# Patient Record
Sex: FEMALE | Race: WHITE | NOT HISPANIC OR LATINO | Employment: STUDENT | URBAN - METROPOLITAN AREA
[De-identification: names, ages, dates, MRNs, and addresses within clinical notes are randomized per-mention and may not be internally consistent; named-entity substitution may affect disease eponyms.]

---

## 2017-10-20 PROBLEM — M25.571 RIGHT ANKLE PAIN: Status: ACTIVE | Noted: 2017-10-20

## 2017-10-20 PROBLEM — M25.671 ANKLE STIFFNESS, RIGHT: Status: ACTIVE | Noted: 2017-10-20

## 2017-10-20 PROBLEM — M25.674: Status: ACTIVE | Noted: 2017-10-20

## 2017-10-20 PROBLEM — M79.671 RIGHT FOOT PAIN: Status: ACTIVE | Noted: 2017-10-20

## 2020-03-11 ENCOUNTER — TELEPHONE (OUTPATIENT)
Dept: CARDIOLOGY | Facility: MEDICAL CENTER | Age: 25
End: 2020-03-11

## 2020-03-11 NOTE — TELEPHONE ENCOUNTER
Called patient in regards to records for NP appointment with  including most recent lab results, ekg, any cardiac testing or have been treated by a cardiologist. No answer, left voicemail to call back.

## 2020-03-12 ENCOUNTER — OFFICE VISIT (OUTPATIENT)
Dept: CARDIOLOGY | Facility: MEDICAL CENTER | Age: 25
End: 2020-03-12
Payer: COMMERCIAL

## 2020-03-12 VITALS
OXYGEN SATURATION: 100 % | BODY MASS INDEX: 21.62 KG/M2 | SYSTOLIC BLOOD PRESSURE: 112 MMHG | HEART RATE: 80 BPM | WEIGHT: 151 LBS | HEIGHT: 70 IN | RESPIRATION RATE: 16 BRPM | DIASTOLIC BLOOD PRESSURE: 76 MMHG

## 2020-03-12 DIAGNOSIS — R00.2 PALPITATIONS: ICD-10-CM

## 2020-03-12 LAB — EKG IMPRESSION: NORMAL

## 2020-03-12 PROCEDURE — 93000 ELECTROCARDIOGRAM COMPLETE: CPT | Performed by: INTERNAL MEDICINE

## 2020-03-12 PROCEDURE — 99214 OFFICE O/P EST MOD 30 MIN: CPT | Performed by: INTERNAL MEDICINE

## 2020-03-12 ASSESSMENT — ENCOUNTER SYMPTOMS
FEVER: 0
LOSS OF CONSCIOUSNESS: 0
NEUROLOGICAL NEGATIVE: 1
EYES NEGATIVE: 1
STRIDOR: 0
WHEEZING: 0
WEAKNESS: 0
CONSTITUTIONAL NEGATIVE: 1
COUGH: 0
ORTHOPNEA: 0
MUSCULOSKELETAL NEGATIVE: 1
GASTROINTESTINAL NEGATIVE: 1
CLAUDICATION: 0
CHILLS: 0
PALPITATIONS: 1
PND: 0
RESPIRATORY NEGATIVE: 1
SPUTUM PRODUCTION: 0
BRUISES/BLEEDS EASILY: 0
SHORTNESS OF BREATH: 0
HEMOPTYSIS: 0
DIZZINESS: 0
SORE THROAT: 0

## 2020-03-12 ASSESSMENT — FIBROSIS 4 INDEX: FIB4 SCORE: 0.8

## 2020-03-12 NOTE — PROGRESS NOTES
Chief Complaint   Patient presents with   • Palpitations     NP DX: PALPITATIONS       Subjective:   Brook Ledesma is a 24 y.o. female who presents today as a new consultation for palpitations.  She is a 24-year-old female who is healthy.  She has a sister has a pacemaker due to vasovagal syncope.  She gets this only when she throws up.  She is never had any syncope episodes herself.  She is been running and working out.  She ran track in [email or web address] [place] [email or web address].  She runs about 3 miles per day.  Sometimes when she does spin and when she gets her pulse rate up she feels like she has skipped heartbeats.  She is otherwise asymptomatic.  She has no chest pain palpitations or PND.    Past Medical History:   Diagnosis Date   • Anxiety    • Migraine      No past surgical history on file.  Family History   Problem Relation Age of Onset   • Migraines Mother    • Thyroid Mother    • No Known Problems Father    • No Known Problems Sister    • Cancer Maternal Grandmother         breast cancer   • Heart Disease Maternal Grandfather         valve replacement     Social History     Socioeconomic History   • Marital status: Single     Spouse name: Not on file   • Number of children: Not on file   • Years of education: Not on file   • Highest education level: Not on file   Occupational History   • Not on file   Social Needs   • Financial resource strain: Not on file   • Food insecurity     Worry: Not on file     Inability: Not on file   • Transportation needs     Medical: Not on file     Non-medical: Not on file   Tobacco Use   • Smoking status: Never Smoker   • Smokeless tobacco: Never Used   Substance and Sexual Activity   • Alcohol use: Yes     Frequency: 2-4 times a month     Drinks per session: 1 or 2     Binge frequency: Never   • Drug use: Never   • Sexual activity: Not on file   Lifestyle   • Physical activity     Days per week: Not on file     Minutes per session: Not on file   • Stress: Not on file   Relationships   •  "Social connections     Talks on phone: Not on file     Gets together: Not on file     Attends [employer] service: Not on file     Active member of club or organization: Not on file     Attends meetings of clubs or organizations: Not on file     Relationship status: Not on file   • Intimate partner violence     Fear of current or ex partner: Not on file     Emotionally abused: Not on file     Physically abused: Not on file     Forced sexual activity: Not on file   Other Topics Concern   • Not on file   Social History Narrative   • Not on file     No Known Allergies  Outpatient Encounter Medications as of 3/12/2020   Medication Sig Dispense Refill   • topiramate (TOPAMAX) 25 MG Tab Take 1 Tab by mouth 2 times a day. 180 Tab 3   • norgestimate-ethinyl estradiol (ESTARYLLA) 0.25-35 MG-MCG per tablet Take 1 Tab by mouth every day.       No facility-administered encounter medications on file as of 3/12/2020.      Review of Systems   Constitutional: Negative.  Negative for chills, fever and malaise/fatigue.   HENT: Negative.  Negative for sore throat.    Eyes: Negative.    Respiratory: Negative.  Negative for cough, hemoptysis, sputum production, shortness of breath, wheezing and stridor.    Cardiovascular: Positive for palpitations. Negative for chest pain, orthopnea, claudication, leg swelling and PND.   Gastrointestinal: Negative.    Genitourinary: Negative.    Musculoskeletal: Negative.    Skin: Negative.    Neurological: Negative.  Negative for dizziness, loss of consciousness and weakness.   Endo/Heme/Allergies: Negative.  Does not bruise/bleed easily.   All other systems reviewed and are negative.       Objective:   /76 (BP Location: Left arm, Patient Position: Sitting, BP Cuff Size: Adult)   Pulse 80   Resp 16   Ht 1.778 m (5' 10\")   Wt 68.5 kg (151 lb)   SpO2 100%   BMI 21.67 kg/m²     Physical Exam   Constitutional: She appears well-developed and well-nourished. No distress.   HENT:   Head: " Normocephalic and atraumatic.   Right Ear: External ear normal.   Left Ear: External ear normal.   Nose: Nose normal.   Mouth/Throat: No oropharyngeal exudate.   Eyes: Pupils are equal, round, and reactive to light. Conjunctivae and EOM are normal. Right eye exhibits no discharge. Left eye exhibits no discharge. No scleral icterus.   Neck: Neck supple. No JVD present.   Cardiovascular: Normal rate, regular rhythm and intact distal pulses. Exam reveals no gallop and no friction rub.   No murmur heard.  Pulmonary/Chest: Effort normal. No stridor. No respiratory distress. She has no wheezes. She has no rales. She exhibits no tenderness.   Abdominal: Soft. She exhibits no distension. There is no guarding.   Musculoskeletal: Normal range of motion.         General: No tenderness, deformity or edema.   Neurological: She is alert. She has normal reflexes. She displays normal reflexes. No cranial nerve deficit. She exhibits normal muscle tone. Coordination normal.   Skin: Skin is warm and dry. No rash noted. She is not diaphoretic. No erythema. No pallor.   Psychiatric: She has a normal mood and affect. Her behavior is normal. Judgment and thought content normal.   Nursing note and vitals reviewed.      Assessment:     1. Palpitations  EKG    [facility] / Event Monitor       Medical Decision Making:  Today's Assessment / Status / Plan:   24-year-old female with palpitations.  We will get an event recorder.  We will call her with results.

## 2020-03-20 ENCOUNTER — APPOINTMENT (OUTPATIENT)
Dept: CARDIOLOGY | Facility: CLINIC | Age: 25
End: 2020-03-20
Payer: COMMERCIAL

## 2020-04-24 ENCOUNTER — TELEPHONE (OUTPATIENT)
Dept: CARDIOLOGY | Facility: MEDICAL CENTER | Age: 25
End: 2020-04-24

## 2020-04-24 ENCOUNTER — NON-PROVIDER VISIT (OUTPATIENT)
Dept: CARDIOLOGY | Facility: MEDICAL CENTER | Age: 25
End: 2020-04-24
Payer: COMMERCIAL

## 2020-04-24 DIAGNOSIS — R00.2 PALPITATIONS: ICD-10-CM

## 2020-04-24 NOTE — TELEPHONE ENCOUNTER
Home enrollment completed for the 14 day Zio patch program per .  Monitor to be mailed to patient by [employer].    >Currently pending EOS.